# Patient Record
Sex: MALE | Race: WHITE | NOT HISPANIC OR LATINO
[De-identification: names, ages, dates, MRNs, and addresses within clinical notes are randomized per-mention and may not be internally consistent; named-entity substitution may affect disease eponyms.]

---

## 2017-07-12 ENCOUNTER — APPOINTMENT (OUTPATIENT)
Dept: GASTROENTEROLOGY | Facility: CLINIC | Age: 32
End: 2017-07-12

## 2017-07-12 DIAGNOSIS — K90.9 INTESTINAL MALABSORPTION, UNSPECIFIED: ICD-10-CM

## 2017-07-13 LAB
ALBUMIN SERPL ELPH-MCNC: 4.7 G/DL
ALP BLD-CCNC: 70 U/L
ALT SERPL-CCNC: 19 U/L
ANION GAP SERPL CALC-SCNC: 19 MMOL/L
AST SERPL-CCNC: 13 U/L
BASOPHILS # BLD AUTO: 0.02 K/UL
BASOPHILS NFR BLD AUTO: 0.3 %
BILIRUB SERPL-MCNC: 0.3 MG/DL
BUN SERPL-MCNC: 19 MG/DL
CALCIUM SERPL-MCNC: 9.9 MG/DL
CHLORIDE SERPL-SCNC: 99 MMOL/L
CO2 SERPL-SCNC: 24 MMOL/L
CREAT SERPL-MCNC: 1.09 MG/DL
CRP SERPL-MCNC: <0.2 MG/DL
EOSINOPHIL # BLD AUTO: 0.14 K/UL
EOSINOPHIL NFR BLD AUTO: 2.2 %
GLUCOSE SERPL-MCNC: 44 MG/DL
HCT VFR BLD CALC: 44.1 %
HGB BLD-MCNC: 14.6 G/DL
IMM GRANULOCYTES NFR BLD AUTO: 0.2 %
LYMPHOCYTES # BLD AUTO: 2.34 K/UL
LYMPHOCYTES NFR BLD AUTO: 37.4 %
MAN DIFF?: NORMAL
MCHC RBC-ENTMCNC: 28.1 PG
MCHC RBC-ENTMCNC: 33.1 GM/DL
MCV RBC AUTO: 85 FL
MONOCYTES # BLD AUTO: 0.5 K/UL
MONOCYTES NFR BLD AUTO: 8 %
NEUTROPHILS # BLD AUTO: 3.25 K/UL
NEUTROPHILS NFR BLD AUTO: 51.9 %
PLATELET # BLD AUTO: 264 K/UL
POTASSIUM SERPL-SCNC: 3.5 MMOL/L
PROT SERPL-MCNC: 8.2 G/DL
RBC # BLD: 5.19 M/UL
RBC # FLD: 13.1 %
SODIUM SERPL-SCNC: 142 MMOL/L
VIT B12 SERPL-MCNC: 456 PG/ML
WBC # FLD AUTO: 6.26 K/UL

## 2017-08-22 ENCOUNTER — APPOINTMENT (OUTPATIENT)
Dept: GASTROENTEROLOGY | Facility: HOSPITAL | Age: 32
End: 2017-08-22

## 2018-12-17 ENCOUNTER — HOSPITAL ENCOUNTER (EMERGENCY)
Dept: HOSPITAL 74 - FER | Age: 33
Discharge: HOME | End: 2018-12-17
Payer: SELF-PAY

## 2018-12-17 VITALS — BODY MASS INDEX: 32.3 KG/M2

## 2018-12-17 VITALS — HEART RATE: 101 BPM

## 2018-12-17 VITALS — SYSTOLIC BLOOD PRESSURE: 154 MMHG | DIASTOLIC BLOOD PRESSURE: 96 MMHG | TEMPERATURE: 98.1 F

## 2018-12-17 DIAGNOSIS — F17.210: ICD-10-CM

## 2018-12-17 DIAGNOSIS — K50.90: ICD-10-CM

## 2018-12-17 DIAGNOSIS — F15.980: Primary | ICD-10-CM

## 2018-12-17 LAB
ALBUMIN SERPL-MCNC: 4.2 G/DL (ref 3.4–5)
ALP SERPL-CCNC: 69 U/L (ref 45–117)
ALT SERPL-CCNC: 24 U/L (ref 13–61)
ANION GAP SERPL CALC-SCNC: 6 MMOL/L (ref 8–16)
AST SERPL-CCNC: 13 U/L (ref 15–37)
BASOPHILS # BLD: 0.4 % (ref 0–2)
BILIRUB SERPL-MCNC: 0.5 MG/DL (ref 0.2–1)
BUN SERPL-MCNC: 14 MG/DL (ref 7–18)
CALCIUM SERPL-MCNC: 8.9 MG/DL (ref 8.5–10.1)
CHLORIDE SERPL-SCNC: 104 MMOL/L (ref 98–107)
CO2 SERPL-SCNC: 29 MMOL/L (ref 21–32)
CREAT SERPL-MCNC: 0.9 MG/DL (ref 0.55–1.3)
DEPRECATED RDW RBC AUTO: 13 % (ref 11.9–15.9)
EOSINOPHIL # BLD: 0.7 % (ref 0–4.5)
GLUCOSE SERPL-MCNC: 120 MG/DL (ref 74–106)
HCT VFR BLD CALC: 43.1 % (ref 35.4–49)
HGB BLD-MCNC: 15.1 GM/DL (ref 11.7–16.9)
INR BLD: 1.05 (ref 0.83–1.09)
LYMPHOCYTES # BLD: 21 % (ref 8–40)
MCH RBC QN AUTO: 28.8 PG (ref 25.7–33.7)
MCHC RBC AUTO-ENTMCNC: 35.1 G/DL (ref 32–35.9)
MCV RBC: 82.1 FL (ref 80–96)
MONOCYTES # BLD AUTO: 8.4 % (ref 3.8–10.2)
NEUTROPHILS # BLD: 69.5 % (ref 42.8–82.8)
PLATELET # BLD AUTO: 297 K/MM3 (ref 134–434)
PMV BLD: 7.4 FL (ref 7.5–11.1)
POTASSIUM SERPLBLD-SCNC: 4.4 MMOL/L (ref 3.5–5.1)
PROT SERPL-MCNC: 7.7 G/DL (ref 6.4–8.2)
PT PNL PPP: 12.4 SEC (ref 9.7–13)
RBC # BLD AUTO: 5.25 M/MM3 (ref 4–5.6)
SODIUM SERPL-SCNC: 138 MMOL/L (ref 136–145)
WBC # BLD AUTO: 7.4 K/MM3 (ref 4–10)

## 2018-12-17 NOTE — EKG
Test Reason : 

Blood Pressure : ***/*** mmHG

Vent. Rate : 101 BPM     Atrial Rate : 101 BPM

   P-R Int : 164 ms          QRS Dur : 090 ms

    QT Int : 340 ms       P-R-T Axes : 044 042 027 degrees

   QTc Int : 440 ms

 

SINUS TACHYCARDIA

OTHERWISE NORMAL ECG

NO PREVIOUS ECGS AVAILABLE

Confirmed by GÉNESIS VELASQUEZ MD ([id number]) on 12/17/2018 9:57:36 AM

 

Referred By: MD GALLO           Confirmed By:GÉNESIS VELASQUEZ MD

## 2018-12-17 NOTE — PDOC
History of Present Illness





- General


Chief Complaint: Psychiatric


Stated Complaint: DIFFICULTY SLEEPING


Time Seen by Provider: 12/17/18 02:19


History Source: Patient


Exam Limitations: No Limitations





- History of Present Illness


Initial Comments: 





12/17/18 02:37


Pt took 4 XTC pills and 1 Brandin over 24hrs ago, and still is tachy and anxious.





Past History





- Travel


Traveled outside of the country in the last 30 days: No


Close contact w/someone who was outside of country & ill: No





- Past Medical History


Allergies/Adverse Reactions: 


 Allergies











Allergy/AdvReac Type Severity Reaction Status Date / Time


 


No Known Allergies Allergy   Verified 04/02/14 13:50











Home Medications: 


Ambulatory Orders





NK [No Known Home Medication]  12/17/18 








GI Disorders: Yes (CROHN'S)


Psychiatric Problems: Yes (ANXIETY)





- Immunization History


Immunization Up to Date: No





- Suicide/Smoking/Psychosocial Hx


Smoking History: Current some day smoker


Have you smoked in the past 12 months: Yes


Number of Cigarettes Smoked Daily: 20


If you are a former smoker, when did you quit?: T


'Breaking Loose' booklet given: 04/12/14


Hx Alcohol Use: No


Drug/Substance Use Hx: No


Substance Use Type: None


Hx Substance Use Treatment: No





**Review of Systems





- Review of Systems


Able to Perform ROS?: Yes


Is the patient limited English proficient: No


Constitutional: No: Symptoms Reported, See HPI, Chills, Diaphoresis, Fever, 

Loss of Appetite, Malaise, Night Sweats, Weakness, Weight Stable, Unintentional 

Wgt. Loss, Unexplained wgt Loss, Other


HEENTM: No: Symptoms Reported, See HPI, Eye Pain, Blurred Vision, Tearing, 

Recent change in vision, Double Vision, Cataracts, Ear Pain, Ocular Prothesis, 

Ear Discharge, Nose Pain, Nose Congestion, Tinnitus, Nose Bleeding, Hearing Loss

, Throat Pain, Throat Swelling, Mouth Pain, Dental Problems, Difficulty 

Swallowing, Mouth Swelling, Other


Respiratory: No: Symptoms reported, See HPI, Cough, Orthopnea, Shortness of 

Breath, SOB with Exertion, SOB at Rest, Stridor, Wheezing, Productive cough, 

Hemoptysis, Other


Cardiac (ROS): Yes: Palpitations.  No: Symptoms Reported, See HPI, Chest Pain, 

Edema, Irregular Heart Rate, Lightheadedness, Syncope, Chest Tightness, Other


ABD/GI: No: Symptoms Reported, See HPI, Abdominal Distended, Abd. Pain w/ 

defecation, Blood Streaked Bowels, Constipated, Diarrhea, Difficulty Swallowing

, Nausea, Poor Appetite, Poor Fluid Intake, Rectal Bleeding, Vomiting, 

Indigestion, Abdominal cramping, Tarry Stools, Other


: No: Symptoms Reported, See HPI, Burning, Dysuria, Discharge, Frequency, 

Flank Pain, Hematuria, Incontinence, Pain, Urgency, Testicular Mass, Testicular 

Swelling, Lesions, Testicular Pain, Other


Musculoskeletal: No: Symptoms Reported, See HPI, Back Pain, Gout, Joint Pain, 

Joint Swelling, Muscle Pain, Muscle Weakness, Neck Pain, Joint Stiffness, Other


Integumentary: No: Symptoms Reported, See HPI, Bruising, Change in Color, 

Change in Hair/Nails, Dryness, Erythema, Flushing, Lesions, Lumps, Pallor, 

Pruritus, Rash, Sweating, Other


Psychiatric: Yes: Anxiety, Sleep Pattern Change.  No: Depression, Frequent 

Crying, Stressors, Emotional Problems, Mood Swings, Change in Appetite, Other


Endocrine: No: Symptoms Reported, See HPI, Excessive Sweating, Flushing, 

Intolerance to Cold, Intolerance to Heat, Increased Hunger, Increased Thirst, 

Increased Urine, Unexplained Weight Gain, Unexplained Weight Loss, Change in 

Weight, Other


Hematologic/Lymphatic: No: Symptoms Reported, See HPI, Anemia, Blood Clots, 

Easy Bleeding, Easy Bruising, Bleeding Diathesis, Lymph Node Abnormalities, 

Swollen Glands, Other





*Physical Exam





- Physical Exam


General Appearance: Yes: Nourished, Appropriately Dressed.  No: Apparent 

Distress


HEENT: positive: EOMI, LALITHA, Normal ENT Inspection, Normal Voice, Symmetrical, 

TMs Normal, Pharynx Normal.  negative: Pale Conjunctivae, Photophobia, Scleral 

Icterus (R), Scleral Icterus (L), Muffled/Hoarse voice, Pharyngeal Erythema, 

Tonsillar Exudate, Tonsillar Erythema, Nasal Congestion, Rhinorrhea, Sinus 

Tenderness, Orbits, Hearing Decreased, Hearing Grossly Normal, TM Bulging, TM 

Dull, TM Erythema, Lesions, Marks, Excessive drooling, Thrush, Other


Neck: positive: Trachea midline, Normal Thyroid, Supple


Respiratory/Chest: positive: Lungs Clear, Normal Breath Sounds


Cardiovascular: positive: Regular Rate, S1, S2, Tachycardia.  negative: Regular 

Rhythm, Edema, JVD, Murmur, Bradycardia, Diastolic Murmur, Systolic Murmur, 

Gallop/S3, Gallop/S4, Irregularly Irregular, Irregular, Other


Gastrointestinal/Abdominal: positive: Normal Bowel Sounds, Flat, Soft.  negative

: Tender, Organomegaly, Pulsatile Mass, Increased Bowel Sounds, Decreased BS, 

Protuberent, Distended, Guarding, Rebound, Tenderness, Hernia, Mass, 

Hepatomegaly, Spleenomegaly, Other


Musculoskeletal: positive: Normal Inspection


Extremity: positive: Normal Capillary Refill


Integumentary: positive: Normal Color, Dry, Warm


Neurologic: positive: CNs II-XII NML intact, Fully Oriented, Alert, Normal Mood/

Affect, Normal Response, Motor Strength 5/5





ED Treatment Course





- LABORATORY


CBC & Chemistry Diagram: 


 12/17/18 02:33





 12/17/18 02:33





Medical Decision Making





- Medical Decision Making





12/17/18 02:34


Pt used 4 MDMA pills 29 hrs ago 9PM on Sat.  Then at 2AM Sunday, 24 hrs ago, he 

took a "brandin"pill and that was extrememly strong, and since then pt has been 

high and unable to sleep and anxious and he has been seing things "visuals" 

when he closes his eyes.  No visual hallucinations or auditory hallucinations. 


He has a hx of Crohn's dz. He was treated 5 yrs ago ane he states that ever 

since he quit smoking, his Crohn's flares have subsided.


12/17/18 02:36


Pt has had beef and rice to eat since Sat night. He is likely dehydrated. HR is 

112.


Labs will be sent and pt will be hydrated with 1L NSS. Then he will be 

reevaluated.


12/17/18 04:02


Labs are normal; pt feels better with 1L IV NSS and he will be discharged home.





*DC/Admit/Observation/Transfer


Diagnosis at time of Disposition: 


 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








- Discharge Dispostion


Disposition: HOME


Condition at time of disposition: Stable


Decision to Admit order: No





- Referrals


Referrals: 


Emre Araujo MD [Primary Care Provider] - 





- Patient Instructions


Printed Discharge Instructions:  Drug Abuse and Drug Addiction





- Post Discharge Activity

## 2018-12-18 ENCOUNTER — HOSPITAL ENCOUNTER (EMERGENCY)
Dept: HOSPITAL 74 - FER | Age: 33
Discharge: HOME | End: 2018-12-18
Payer: SELF-PAY

## 2018-12-18 VITALS — SYSTOLIC BLOOD PRESSURE: 135 MMHG | DIASTOLIC BLOOD PRESSURE: 86 MMHG | HEART RATE: 88 BPM | TEMPERATURE: 98.4 F

## 2018-12-18 VITALS — BODY MASS INDEX: 32.3 KG/M2

## 2018-12-18 DIAGNOSIS — Z87.891: ICD-10-CM

## 2018-12-18 DIAGNOSIS — F41.9: ICD-10-CM

## 2018-12-18 DIAGNOSIS — F15.980: ICD-10-CM

## 2018-12-18 DIAGNOSIS — R44.1: ICD-10-CM

## 2018-12-18 DIAGNOSIS — G47.00: Primary | ICD-10-CM

## 2018-12-18 DIAGNOSIS — K50.90: ICD-10-CM

## 2018-12-18 LAB
AMPHET UR-MCNC: POSITIVE NG/ML
BARBITURATES UR-MCNC: NEGATIVE NG/ML
BENZODIAZ UR SCN-MCNC: NEGATIVE NG/ML
COCAINE UR-MCNC: NEGATIVE NG/ML
METHADONE UR-MCNC: NEGATIVE NG/ML
OPIATES UR QL SCN: NEGATIVE NG/ML
PCP UR QL SCN: NEGATIVE NG/ML

## 2018-12-18 NOTE — PDOC
History of Present Illness





- General


Chief Complaint: Substance Abuse


Stated Complaint: TROUBLE SLEEPING


Time Seen by Provider: 12/18/18 07:18





- History of Present Illness


Initial Comments: 





12/18/18 08:42





32 years old past medical history significant for anxiety was on sertraline but 

has been off it for one year has occasional use of MDMA approximately once a 

month on Saturday evening he used more than he normally does presented to the 

emergency Department first on Sunday morning secondary to anxiety inability to 

sleep and visual hallucinations only when he closes his eyes but no suicidal or 

homicidal ideation. Return again to the emergency department yesterday for the 

same symptomatology but decided not to register and now presents for third time 

to the emergency department for the same symptoms. Again denies any suicidal 

ideation or homicidal ideation denies being risk to himself or others. Is 

currently staying with his parents simply has not been able to sleep in the 

last 2-1/2 days.





Symptoms are persistent concent moderate severity with no exacerbating or 

relieving factors.








Past History





- Past Medical History


Allergies/Adverse Reactions: 


 Allergies











Allergy/AdvReac Type Severity Reaction Status Date / Time


 


No Known Allergies Allergy   Verified 12/18/18 07:08











Home Medications: 


Ambulatory Orders





Quetiapine Fumarate "Xr" [Seroquel Xr -] 50 mg PO BID #5 tablet 12/18/18 








COPD: No


GI Disorders: Yes (CROHN'S)


Psychiatric Problems: Yes (ANXIETY)





- Surgical History


Abdominal Surgery: Yes (ILLEOSTOMY AND REVERSAL)





- Immunization History


Immunization Up to Date: No





- Suicide/Smoking/Psychosocial Hx


Smoking History: Never smoked


Have you smoked in the past 12 months: Yes


Number of Cigarettes Smoked Daily: 20


If you are a former smoker, when did you quit?: T


'Breaking Loose' booklet given: 04/12/14


Hx Alcohol Use: Yes


Drug/Substance Use Hx: Yes


Substance Use Type: None


Hx Substance Use Treatment: No





**Review of Systems





- Review of Systems


Comments:: 





12/18/18 08:43





ROS:  A complete review of 10 out of 10 review of systems is taken and is 

negative apart from what is previously mentioned below and in the HPI.








*Physical Exam





- Vital Signs


 Last Vital Signs











Temp Pulse Resp BP Pulse Ox


 


 98.4 F   88   18   135/86   100 


 


 12/18/18 07:08  12/18/18 07:08  12/18/18 07:08  12/18/18 07:08  12/18/18 07:08














- Physical Exam


Comments: 





12/18/18 08:44








Vitals: Triage Vital reviewed


General Appearance:  no acute distress, well nourished well developed, 


Head: Atraumatic, 


Eyes: Pupils equal reactive round, extraocular movement intact


Cardiac: Regular rate and rhythym, no murmurs, no rubs, no gallops, 


Lungs: Clear to auscultation bilateral, good air movement bilaterally,


Extremities: Full range of motion to all extremities, no cyanosis, clubbing, or 

edema


Skin:  Warm and dry, no rashes or lesions, no rash, no petechiae


Neuro: AOX3; Cranial Nerves 2-12 grossly intact, Strength intact to all 

extremities, Sensation intact to all extremities,gait normal


Psych:  anxious, but appropriate, denies SI/HI/AH, +VH only when closing eyes








Moderate Sedation





- Procedure Monitoring


Vital Signs: 


Procedure Monitoring Vital Signs











Temperature  98.4 F   12/18/18 07:08


 


Pulse Rate  88   12/18/18 07:08


 


Respiratory Rate  18   12/18/18 07:08


 


Blood Pressure  135/86   12/18/18 07:08


 


O2 Sat by Pulse Oximetry (%)  100   12/18/18 07:08











Medical Decision Making





- Medical Decision Making





12/18/18 08:45





2-1/2 days of insomnia and anxiety secondary to recent MDMA use. No suicidal 

ideation/ homicidal ideation occasional visual hallucinations but only when 

patient close his eyes.





Case discussed with psychiatry , no indication for psychiatric 

admission at this time patient is not harm to himself or others we will treat 

with short three-day course of Seroquel to help patient sleep he will be 

staying with his parents during this period hopefully symptomatology improves 

once patient is able to sleep for a few days if not patient will return to the 

emergency department for official psychiatric consultation





Findings, the need for follow-up and strict return instructions discussed with 

patient.














*DC/Admit/Observation/Transfer


Diagnosis at time of Disposition: 


 Visual hallucinations, Methylenedioxymethamphetamine (MDMA)-induced anxiety 

disorder





Insomnia


Qualifiers:


 Insomnia type: unspecified Qualified Code(s): G47.00 - Insomnia, unspecified








- Discharge Dispostion


Disposition: HOME


Condition at time of disposition: Stable


Decision to Admit order: No





- Prescriptions


Prescriptions: 


Quetiapine Fumarate "Xr" [Seroquel Xr -] 50 mg PO BID #5 tablet





- Referrals


Referrals: 


Dre Funk MD [Staff Physician] - 





- Patient Instructions


Additional Instructions: 


Take 1 tab seroquel now, one tonight. Same tomorrow. Then 1 tab at night on the 

third night. If you're still not sleeping or continue to have hallucinations 

please return to the emergency department. If if any thoughts of hurting your 

self or any one else please come to the emergency department immediately.





- Post Discharge Activity

## 2019-01-09 ENCOUNTER — APPOINTMENT (OUTPATIENT)
Dept: FAMILY MEDICINE | Facility: CLINIC | Age: 34
End: 2019-01-09

## 2019-01-24 ENCOUNTER — APPOINTMENT (OUTPATIENT)
Dept: FAMILY MEDICINE | Facility: CLINIC | Age: 34
End: 2019-01-24
Payer: COMMERCIAL

## 2019-01-24 VITALS
WEIGHT: 225 LBS | OXYGEN SATURATION: 98 % | HEART RATE: 83 BPM | DIASTOLIC BLOOD PRESSURE: 86 MMHG | SYSTOLIC BLOOD PRESSURE: 144 MMHG | HEIGHT: 70 IN | BODY MASS INDEX: 32.21 KG/M2

## 2019-01-24 PROCEDURE — 99203 OFFICE O/P NEW LOW 30 MIN: CPT

## 2019-01-24 RX ORDER — CHOLESTYRAMINE 4 G/9G
4 POWDER, FOR SUSPENSION ORAL TWICE DAILY
Qty: 60 | Refills: 2 | Status: DISCONTINUED | COMMUNITY
Start: 2017-07-13 | End: 2019-01-24

## 2019-01-24 NOTE — HISTORY OF PRESENT ILLNESS
[FreeTextEntry8] : The patient reports increased anxiety and poor sleep\par He has a history of depression and was on 200 mg of Sertraline in the past\par His Crohn's disease is well controlled without medication

## 2019-01-24 NOTE — PHYSICAL EXAM
[No Acute Distress] : no acute distress [Well Nourished] : well nourished [Well Developed] : well developed [Normal Outer Ear/Nose] : the outer ears and nose were normal in appearance [Normal Oropharynx] : the oropharynx was normal [No Respiratory Distress] : no respiratory distress  [Clear to Auscultation] : lungs were clear to auscultation bilaterally [No Accessory Muscle Use] : no accessory muscle use [Normal Rate] : normal rate  [Regular Rhythm] : with a regular rhythm [Normal S1, S2] : normal S1 and S2

## 2019-01-24 NOTE — HEALTH RISK ASSESSMENT
[] : No [No falls in past year] : Patient reported no falls in the past year [3] : 1) Little interest or pleasure doing things for nearly every day (3) [2] : 2) Feeling down, depressed, or hopeless for more than half of the days (2) [de-identified] : GI [[id number]] : 5

## 2019-01-24 NOTE — PLAN
[FreeTextEntry1] : Educated about sleep hygienne\par Take medication as instructed \par call back if not well

## 2019-01-31 ENCOUNTER — LABORATORY RESULT (OUTPATIENT)
Age: 34
End: 2019-01-31

## 2019-01-31 ENCOUNTER — APPOINTMENT (OUTPATIENT)
Dept: FAMILY MEDICINE | Facility: CLINIC | Age: 34
End: 2019-01-31
Payer: COMMERCIAL

## 2019-01-31 VITALS
BODY MASS INDEX: 31.64 KG/M2 | WEIGHT: 221 LBS | DIASTOLIC BLOOD PRESSURE: 86 MMHG | HEIGHT: 70 IN | SYSTOLIC BLOOD PRESSURE: 144 MMHG

## 2019-01-31 DIAGNOSIS — Z87.898 PERSONAL HISTORY OF OTHER SPECIFIED CONDITIONS: ICD-10-CM

## 2019-01-31 PROCEDURE — 99214 OFFICE O/P EST MOD 30 MIN: CPT

## 2019-01-31 NOTE — PHYSICAL EXAM
[Speech Grossly Normal] : speech grossly normal [Memory Grossly Normal] : memory grossly normal [Normal Insight/Judgement] : insight and judgment were intact [No Respiratory Distress] : no respiratory distress  [No Accessory Muscle Use] : no accessory muscle use [Well Nourished] : well nourished [de-identified] : Anxious appearing

## 2019-01-31 NOTE — REVIEW OF SYSTEMS
[Recent Change In Weight] : ~T recent weight change [Insomnia] : insomnia [Anxiety] : anxiety [Fever] : no fever [Chills] : no chills [Dizziness] : no dizziness [Unsteady Walk] : no ataxia

## 2019-01-31 NOTE — PLAN
[FreeTextEntry1] : I urged Jamari to see a psychiatrist to further assess his current symptoms. We discussed the unlikeliness that he is still experiencing the effects of the drug he took over one month ago. I prescribed him Temazepam 30mg to be taken at night with the Trazodone 150mg at night. I recommended that he keep taking the Sertraline 100mg. He will follow up with me in one week to discuss if the medications are working. We will run a urine tox screen. Routine labs drawn. I will call him to discuss the results of his blood and urine labs.

## 2019-01-31 NOTE — HEALTH RISK ASSESSMENT
[No falls in past year] : Patient reported no falls in the past year [0] : 1) Little interest or pleasure doing things: Not at all (0) [1] : 2) Feeling down, depressed, or hopeless for several days (1) [] : No [[id number]] : 1

## 2019-01-31 NOTE — HISTORY OF PRESENT ILLNESS
[FreeTextEntry1] : Insomnia and excessive energy. [de-identified] : Berhane is a 33 year old male with a history of anxiety and depression here due to insomnia and excessive energy which he has been experiencing for one month. He took an unidentified drug on 12/27 and since then he has been unable to sleep more than 2-3 hours at a time. He is overly preoccupied with his drug ingestion and insists that his symptoms are attributable to this. I prescribed him Trazodone 150mg, Sertraline 100mg, and Alprazolam .25mg on 1/24 which he took inconsistently. These did not alleviate his symptoms. He saw a neurologist who ordered a brain MRI, which was normal. He denies excessive talkativeness and spending more money than usual. His family history is notable only for depression.

## 2019-01-31 NOTE — ASSESSMENT
[FreeTextEntry1] : 33 year old male with psychiatric disturbance causing excessive energy and insomnia vs hyperthyroidism.

## 2019-02-01 LAB
ALBUMIN SERPL ELPH-MCNC: 5.1 G/DL
ALP BLD-CCNC: 68 U/L
ALT SERPL-CCNC: 13 U/L
ANION GAP SERPL CALC-SCNC: 15 MMOL/L
AST SERPL-CCNC: 11 U/L
BASOPHILS # BLD AUTO: 0.04 K/UL
BASOPHILS NFR BLD AUTO: 0.6 %
BILIRUB SERPL-MCNC: 0.8 MG/DL
BUN SERPL-MCNC: 10 MG/DL
CALCIUM SERPL-MCNC: 10.2 MG/DL
CHLORIDE SERPL-SCNC: 100 MMOL/L
CHOLEST SERPL-MCNC: 178 MG/DL
CHOLEST/HDLC SERPL: 2.7 RATIO
CO2 SERPL-SCNC: 27 MMOL/L
CREAT SERPL-MCNC: 0.93 MG/DL
EOSINOPHIL # BLD AUTO: 0.03 K/UL
EOSINOPHIL NFR BLD AUTO: 0.5 %
GLUCOSE SERPL-MCNC: 103 MG/DL
HBA1C MFR BLD HPLC: 5.2 %
HCT VFR BLD CALC: 48.5 %
HDLC SERPL-MCNC: 67 MG/DL
HGB BLD-MCNC: 16.1 G/DL
IMM GRANULOCYTES NFR BLD AUTO: 0.5 %
LDLC SERPL CALC-MCNC: 93 MG/DL
LYMPHOCYTES # BLD AUTO: 1.85 K/UL
LYMPHOCYTES NFR BLD AUTO: 29.6 %
MAN DIFF?: NORMAL
MCHC RBC-ENTMCNC: 28.3 PG
MCHC RBC-ENTMCNC: 33.2 GM/DL
MCV RBC AUTO: 85.4 FL
MONOCYTES # BLD AUTO: 0.4 K/UL
MONOCYTES NFR BLD AUTO: 6.4 %
NEUTROPHILS # BLD AUTO: 3.9 K/UL
NEUTROPHILS NFR BLD AUTO: 62.4 %
PLATELET # BLD AUTO: 310 K/UL
POTASSIUM SERPL-SCNC: 4.6 MMOL/L
PROT SERPL-MCNC: 7.8 G/DL
RBC # BLD: 5.68 M/UL
RBC # FLD: 13.3 %
SODIUM SERPL-SCNC: 142 MMOL/L
TRIGL SERPL-MCNC: 89 MG/DL
TSH SERPL-ACNC: 1.01 UIU/ML
VIT B12 SERPL-MCNC: 358 PG/ML
WBC # FLD AUTO: 6.25 K/UL

## 2019-02-04 ENCOUNTER — RX RENEWAL (OUTPATIENT)
Age: 34
End: 2019-02-04

## 2019-02-04 LAB
AMPHET UR-MCNC: NEGATIVE
BARBITURATES UR-MCNC: NEGATIVE
BENZODIAZ UR-MCNC: NEGATIVE
COCAINE METAB.OTHER UR-MCNC: NEGATIVE
CREATININE, URINE: 303.6 MG/DL
METHADONE UR-MCNC: NEGATIVE
METHAQUALONE UR-MCNC: NEGATIVE
OPIATES UR-MCNC: NEGATIVE
PCP UR-MCNC: NEGATIVE
PROPOXYPH UR QL: NEGATIVE
THC UR QL: NEGATIVE

## 2019-02-04 RX ORDER — CLONAZEPAM 0.5 MG/1
0.5 TABLET ORAL TWICE DAILY
Qty: 60 | Refills: 0 | Status: ACTIVE | COMMUNITY
Start: 2019-02-04 | End: 1900-01-01

## 2019-02-06 ENCOUNTER — APPOINTMENT (OUTPATIENT)
Dept: FAMILY MEDICINE | Facility: CLINIC | Age: 34
End: 2019-02-06

## 2019-09-26 ENCOUNTER — APPOINTMENT (OUTPATIENT)
Dept: GASTROENTEROLOGY | Facility: CLINIC | Age: 34
End: 2019-09-26
Payer: COMMERCIAL

## 2019-09-26 VITALS
HEIGHT: 70 IN | OXYGEN SATURATION: 97 % | BODY MASS INDEX: 38.65 KG/M2 | WEIGHT: 270 LBS | RESPIRATION RATE: 16 BRPM | DIASTOLIC BLOOD PRESSURE: 90 MMHG | HEART RATE: 80 BPM | TEMPERATURE: 98 F | SYSTOLIC BLOOD PRESSURE: 130 MMHG

## 2019-09-26 DIAGNOSIS — K50.90 CROHN'S DISEASE, UNSPECIFIED, W/OUT COMPLICATIONS: ICD-10-CM

## 2019-09-26 PROCEDURE — 99214 OFFICE O/P EST MOD 30 MIN: CPT | Mod: 25

## 2019-09-26 PROCEDURE — 36415 COLL VENOUS BLD VENIPUNCTURE: CPT

## 2019-09-26 RX ORDER — SERTRALINE HYDROCHLORIDE 100 MG/1
100 TABLET, FILM COATED ORAL
Qty: 30 | Refills: 5 | Status: DISCONTINUED | COMMUNITY
Start: 2019-01-24 | End: 2019-09-26

## 2019-09-26 RX ORDER — ALPRAZOLAM 0.25 MG/1
0.25 TABLET ORAL 3 TIMES DAILY
Qty: 21 | Refills: 0 | Status: DISCONTINUED | COMMUNITY
Start: 2019-01-24 | End: 2019-09-26

## 2019-09-26 RX ORDER — TEMAZEPAM 30 MG/1
30 CAPSULE ORAL
Qty: 30 | Refills: 0 | Status: DISCONTINUED | COMMUNITY
Start: 2019-01-31 | End: 2019-09-26

## 2019-09-26 RX ORDER — TRAZODONE HYDROCHLORIDE 150 MG/1
150 TABLET ORAL
Qty: 30 | Refills: 5 | Status: DISCONTINUED | COMMUNITY
Start: 2019-01-24 | End: 2019-09-26

## 2019-09-26 NOTE — DISCUSSION/SUMMARY
[FreeTextEntry1] : 28 y.o M with Crohns disease, s/p perforated ileum (4/2014)-->ileostomy-->ileostomy reversal (8/2014). Pt started on post surgery ppx with Remicade (s/p 3rd IFX infusion-tolerated well). Pt with mild stool freq, but all other clinical indicators (hgb/crp) suggest he's in remission.\par \par 1. PRN Immodium\par 2. Fecal Caprotectin\par 3.  Cscope to be done within 6mo of reanastomosis\par 4. Cont IFX maintenance q8wks\par 5.  Contact dermatitis: avoid Nickel\par 6.  f/u after procedure\par

## 2019-09-26 NOTE — CONSULT LETTER
[Dear  ___] : Dear  [unfilled], [Courtesy Letter:] : I had the pleasure of seeing your patient, [unfilled], in my office today. [Please see my note below.] : Please see my note below. [Sincerely,] : Sincerely, [FreeTextEntry3] : Daniel Eli MD\par Director, IBD Program\par [facility], [facility]\par \par Associate Professor of Medicine\par [employer]\par [employer] [facility] [employer] [facility]\par

## 2019-09-26 NOTE — HISTORY OF PRESENT ILLNESS
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 Reports worse with food and vomiting with food (4x Tuesday into Wednesday). Denies coffee ground emesis, was vomiting up food. Having range of 3-4 BMs per day or vomiting if no BMs. Pt reports for the last month he's had both pain and vomiting. No fevers/chills. No melena, no hematochezia.\par No EIMs. 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 Started Zyprexa and gained 40 lbs, continues on it.  \par \par INTERVAL HX\par - Pt states he was doing well on IFX but got lazy doing long infusions and was feeling well so stopped following up\par - Now he presents to re-establish care as he has had about 2 flares since discontinuing IFX and one episode of rectal bleeding last week. He states that he was in his usual state of health but woke up at 2am with crampy diffuse abdominal pain that lasted 8hrs, nausea and vomiitng x2. He states the next day when he had a BM there was a moderate amount of blood in the toilet bowel. \par - Since surgery he has been having diarrhea with a BM 4-5x/day, [place] 5-7. Non bloody no mucus. 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 Pt started on post surgery ppx with Remicade (s/p 3rd IFX infusion-tolerated well). Pt reports gaining weight and feels significantly better. Pt still has 5-6watery BM/day. No abd pain. \par Wants to lose weight and get back to the gym.Latasha anal region feels a little burning due to frequency.

## 2019-09-26 NOTE — PHYSICAL EXAM
[General Appearance - Alert] : alert [General Appearance - In No Acute Distress] : in no acute distress [General Appearance - Well Nourished] : well nourished [Sclera] : the sclera and conjunctiva were normal [Outer Ear] : the ears and nose were normal in appearance [Neck Appearance] : the appearance of the neck was normal [Apical Impulse] : the apical impulse was normal [Heart Sounds] : normal S1 and S2 [Edema] : there was no peripheral edema [Bowel Sounds] : normal bowel sounds [Abdomen Soft] : soft [Abdomen Mass (___ Cm)] : no abdominal mass palpated [Cervical Lymph Nodes Enlarged Posterior Bilaterally] : posterior cervical [Cervical Lymph Nodes Enlarged Anterior Bilaterally] : anterior cervical [No CVA Tenderness] : no ~M costovertebral angle tenderness [Abnormal Walk] : normal gait [] : no rash [Oriented To Time, Place, And Person] : oriented to person, place, and time [No Focal Deficits] : no focal deficits [FreeTextEntry1] : +TTP RLQ/ileostomy scar site to moderate palpation. Nonperitoneal. Well healed midline scar and ileostomy scar.

## 2019-09-26 NOTE — REVIEW OF SYSTEMS
[Negative] : Heme/Lymph [Abdominal Pain] : abdominal pain [Vomiting] : vomiting [Diarrhea] : diarrhea

## 2019-09-26 NOTE — ASSESSMENT
[FreeTextEntry1] : 33 Y M with ileocecal CD dx 2014 s/p ileocecectomy after perforation with 2 years of post-op IFX that was self-discontinued now with intermittent symptoms x 1 month representing CD flare vs partial SBO. \par \par - Will order CT enterography to evaluate for post-operative CD recurrence in small bowel or obstruction\par - Labs today including CBC, comp, CRP\par - consider cscope after CTE \par - Avoid NSAIDS\par - If episode of intermittent crampy abd pain with n/v/bloating, pt advised to do trial of NPO until resolution; explained threshold for ER eval\par - pt amenable to restart therapy now if needed ; will be reasonable to retest HBV/TB status at that time\par \par F/U after CTE

## 2019-09-27 LAB
ALBUMIN SERPL ELPH-MCNC: 4.5 G/DL
ALP BLD-CCNC: 67 U/L
ALT SERPL-CCNC: 24 U/L
ANION GAP SERPL CALC-SCNC: 13 MMOL/L
AST SERPL-CCNC: 13 U/L
BASOPHILS # BLD AUTO: 0.04 K/UL
BASOPHILS NFR BLD AUTO: 0.7 %
BILIRUB SERPL-MCNC: 0.3 MG/DL
BUN SERPL-MCNC: 13 MG/DL
CALCIUM SERPL-MCNC: 9.3 MG/DL
CHLORIDE SERPL-SCNC: 101 MMOL/L
CO2 SERPL-SCNC: 27 MMOL/L
CREAT SERPL-MCNC: 0.77 MG/DL
CRP SERPL-MCNC: 0.54 MG/DL
EOSINOPHIL # BLD AUTO: 0.09 K/UL
EOSINOPHIL NFR BLD AUTO: 1.5 %
GLUCOSE SERPL-MCNC: 101 MG/DL
HBV CORE IGG+IGM SER QL: NONREACTIVE
HBV CORE IGM SER QL: NONREACTIVE
HBV SURFACE AB SER QL: REACTIVE
HBV SURFACE AG SER QL: NONREACTIVE
HCT VFR BLD CALC: 46.2 %
HCV AB SER QL: NONREACTIVE
HCV S/CO RATIO: 0.2 S/CO
HGB BLD-MCNC: 15.1 G/DL
IMM GRANULOCYTES NFR BLD AUTO: 0.3 %
LYMPHOCYTES # BLD AUTO: 2.27 K/UL
LYMPHOCYTES NFR BLD AUTO: 38.3 %
MAN DIFF?: NORMAL
MCHC RBC-ENTMCNC: 27.3 PG
MCHC RBC-ENTMCNC: 32.7 GM/DL
MCV RBC AUTO: 83.4 FL
MONOCYTES # BLD AUTO: 0.5 K/UL
MONOCYTES NFR BLD AUTO: 8.4 %
NEUTROPHILS # BLD AUTO: 3 K/UL
NEUTROPHILS NFR BLD AUTO: 50.8 %
PLATELET # BLD AUTO: 292 K/UL
POTASSIUM SERPL-SCNC: 3.9 MMOL/L
PROT SERPL-MCNC: 7.1 G/DL
RBC # BLD: 5.54 M/UL
RBC # FLD: 12.6 %
SODIUM SERPL-SCNC: 141 MMOL/L
WBC # FLD AUTO: 5.92 K/UL

## 2019-09-30 LAB
M TB IFN-G BLD-IMP: NEGATIVE
QUANTIFERON TB PLUS MITOGEN MINUS NIL: 2.1 IU/ML
QUANTIFERON TB PLUS NIL: 0.01 IU/ML
QUANTIFERON TB PLUS TB1 MINUS NIL: 0 IU/ML
QUANTIFERON TB PLUS TB2 MINUS NIL: 0 IU/ML

## 2020-02-19 ENCOUNTER — APPOINTMENT (OUTPATIENT)
Dept: FAMILY MEDICINE | Facility: CLINIC | Age: 35
End: 2020-02-19
Payer: COMMERCIAL

## 2020-02-19 VITALS
HEART RATE: 88 BPM | OXYGEN SATURATION: 96 % | SYSTOLIC BLOOD PRESSURE: 148 MMHG | WEIGHT: 260 LBS | RESPIRATION RATE: 16 BRPM | BODY MASS INDEX: 37.22 KG/M2 | HEIGHT: 70 IN | DIASTOLIC BLOOD PRESSURE: 88 MMHG

## 2020-02-19 DIAGNOSIS — F32.9 ANXIETY DISORDER, UNSPECIFIED: ICD-10-CM

## 2020-02-19 DIAGNOSIS — F51.05 INSOMNIA DUE TO OTHER MENTAL DISORDER: ICD-10-CM

## 2020-02-19 DIAGNOSIS — F41.9 ANXIETY DISORDER, UNSPECIFIED: ICD-10-CM

## 2020-02-19 DIAGNOSIS — K60.2 ANAL FISSURE, UNSPECIFIED: ICD-10-CM

## 2020-02-19 DIAGNOSIS — E66.9 OBESITY, UNSPECIFIED: ICD-10-CM

## 2020-02-19 DIAGNOSIS — F41.8 INSOMNIA DUE TO OTHER MENTAL DISORDER: ICD-10-CM

## 2020-02-19 PROCEDURE — 99214 OFFICE O/P EST MOD 30 MIN: CPT

## 2020-02-19 NOTE — HISTORY OF PRESENT ILLNESS
[FreeTextEntry1] : F/U depressive disorder\par Insomnia\par Anal fissura [de-identified] : Currently is seeing a psychiatrist in [facility]\par reports improved mood on current medication, however still with significant insomnia\par Need referral for anal fissura\par Gaining weight

## 2020-02-19 NOTE — REVIEW OF SYSTEMS
[Fatigue] : fatigue [Anxiety] : anxiety [Insomnia] : insomnia [Depression] : depression [Negative] : Respiratory

## 2020-02-19 NOTE — HEALTH RISK ASSESSMENT
[No falls in past year] : Patient reported no falls in the past year [[id number]] : 2 [1] : 2) Feeling down, depressed, or hopeless for several days (1)

## 2020-02-19 NOTE — PLAN
[FreeTextEntry1] : Will see how respond to Seroquel QHS\par Need to loose weight\par Pending sleep study\par Referred Dr Ya for anal fissura eval

## 2020-05-22 ENCOUNTER — NON-APPOINTMENT (OUTPATIENT)
Age: 35
End: 2020-05-22

## 2020-05-23 RX ORDER — CHOLESTYRAMINE 4 G/9G
4 POWDER, FOR SUSPENSION ORAL DAILY
Qty: 60 | Refills: 5 | Status: ACTIVE | COMMUNITY
Start: 2020-05-23 | End: 1900-01-01

## 2020-10-25 RX ORDER — ESCITALOPRAM OXALATE 20 MG/1
20 TABLET ORAL
Qty: 7 | Refills: 0 | Status: ACTIVE | COMMUNITY
Start: 1900-01-01 | End: 1900-01-01

## 2020-10-25 RX ORDER — QUETIAPINE FUMARATE 50 MG/1
50 TABLET ORAL
Qty: 7 | Refills: 0 | Status: ACTIVE | COMMUNITY
Start: 1900-01-01 | End: 1900-01-01